# Patient Record
(demographics unavailable — no encounter records)

---

## 2024-10-08 NOTE — PLAN
[FreeTextEntry1] : The current plan is to address her lower back pain with an X-ray and potential referral to an orthopedist or physical therapy depending on the result. I also plan on conducting a bone density scan due to her history of falling. I have recommended treatment for the discomfort on her leg. For her grief, the patient is encouraged to continue attending her groups and consider one-on-one counseling. I have also ordered lab tests to check her overall wellness, including a mammogram.

## 2024-10-08 NOTE — HEALTH RISK ASSESSMENT
[Fair] :  ~his/her~ mood as fair [Monthly or less (1 pt)] : Monthly or less (1 point) [1 or 2 (0 pts)] : 1 or 2 (0 points) [Never (0 pts)] : Never (0 points) [Yes] : In the past 12 months have you used drugs other than those required for medical reasons? Yes [No falls in past year] : Patient reported no falls in the past year [1] : 2) Feeling down, depressed, or hopeless for several days (1) [PHQ-2 Negative - No further assessment needed] : PHQ-2 Negative - No further assessment needed [Former] : Former [20 or more] : 20 or more [> 15 Years] : > 15 Years [FreeTextEntry1] : Grieving loss of brother A (anniversary Thanksgiving) [Audit-CScore] : 1 [de-identified] : THC Gummies [de-identified] : Not that physically active [de-identified] : Could be better [[id number]] : 2 [ColonoscopyDate] : 02/17

## 2024-10-08 NOTE — HISTORY OF PRESENT ILLNESS
[FreeTextEntry1] : Lower back pain and general wellness check. [de-identified] : Ms. Alonzo is a 69-year-old female who presents for her annual wellness visit. She has been experiencing worsening lower back pain and discomfort in her left leg. She reports being part of two separate grief groups following the loss of her brother, and she's seen improvement in her emotional state since starting these groups.  The patient has a chronic issue with lower back pain which she links to several falls she's had in the past. She has difficulty standing straight due to the pain and reports having to lean on shopping carts when at the store. She also reports having pain in her left leg but doesn't believe it's related to her back issues. She mentions a past injury to her left ankle which involved screws being placed in the joint, but these do not bother her much now. Her back pain has been worsening for the last year, coinciding with the passing of her brother, and diminished physical activity. Patient additionally c/o of cough of more than 1 week, productive of green sputum. She denies any sick contacts, fever or chills.  Fungal dermatitis has not resolved. She tried Rx powder and home remedy with tea tree oil.

## 2024-10-08 NOTE — PHYSICAL EXAM
[Normal Outer Ear/Nose] : the outer ears and nose were normal in appearance [Supple] : supple [Normal] : normal rate, regular rhythm, normal S1 and S2 and no murmur heard [Pedal Pulses Present] : the pedal pulses are present [No Edema] : there was no peripheral edema [No Joint Swelling] : no joint swelling [Grossly Normal Strength/Tone] : grossly normal strength/tone [Coordination Grossly Intact] : coordination grossly intact [No Focal Deficits] : no focal deficits [Normal Gait] : normal gait [Normal Affect] : the affect was normal [Normal Insight/Judgement] : insight and judgment were intact [de-identified] : +varicose veins [de-identified] : ROM flexion and extension limited by pain [de-identified] : Fungal dermatitis abdominal fold

## 2024-10-08 NOTE — ASSESSMENT
[FreeTextEntry1] : Differential Diagnosis: - Chronic musculoskeletal pain - Sciatica - Osteoarthritis - Herniated disk

## 2025-05-01 NOTE — PHYSICAL EXAM
[Supple] : supple [Normal] : soft, non-tender, non-distended, no masses palpated, no HSM and normal bowel sounds [No Joint Swelling] : no joint swelling [Grossly Normal Strength/Tone] : grossly normal strength/tone [No Rash] : no rash [Coordination Grossly Intact] : coordination grossly intact [No Focal Deficits] : no focal deficits [Normal Gait] : normal gait [Normal Affect] : the affect was normal [Normal Insight/Judgement] : insight and judgment were intact

## 2025-05-01 NOTE — HEALTH RISK ASSESSMENT
[0] : 2) Feeling down, depressed, or hopeless: Not at all (0) [PHQ-2 Negative - No further assessment needed] : PHQ-2 Negative - No further assessment needed [Former] : Former [20 or more] : 20 or more [> 15 Years] : > 15 Years [[id number]] : 0

## 2025-05-01 NOTE — HISTORY OF PRESENT ILLNESS
[FreeTextEntry1] : Follow up chronic medical conditions and Recurring diarrhea & Ear pain [de-identified] : Patient reported that the diarrhea started about three weeks ago. Despite taking dietary measures to counteract it, her condition didn't improve. She claimed that her stool has been very loose, with a slight color change to lighter brown and her urine has turned bright yellow recently. The patient claims to have had a colonoscopy in 2017 with Dr. Brown, who suggested coming for a checkup again in around seven years, which is due now. Coincidentally, she reported developing a sensation in her right ear, accompanied by pain when she bites down, starting two weeks ago. The patient suspects it might be related to her TMJ problem and her mouth guard. [FreeTextEntry8] : Ear pain and diarrhea

## 2025-05-01 NOTE — PLAN
[FreeTextEntry1] : Continue supporting treatment for diarrhea. Stool and blood testing to investigate further. Meds renewed as requested.

## 2025-05-01 NOTE — HISTORY OF PRESENT ILLNESS
[FreeTextEntry1] : Follow up chronic medical conditions and Recurring diarrhea & Ear pain [de-identified] : Patient reported that the diarrhea started about three weeks ago. Despite taking dietary measures to counteract it, her condition didn't improve. She claimed that her stool has been very loose, with a slight color change to lighter brown and her urine has turned bright yellow recently. The patient claims to have had a colonoscopy in 2017 with Dr. Brown, who suggested coming for a checkup again in around seven years, which is due now. Coincidentally, she reported developing a sensation in her right ear, accompanied by pain when she bites down, starting two weeks ago. The patient suspects it might be related to her TMJ problem and her mouth guard. [FreeTextEntry8] : Ear pain and diarrhea

## 2025-05-01 NOTE — HEALTH RISK ASSESSMENT
[0] : 2) Feeling down, depressed, or hopeless: Not at all (0) [PHQ-2 Negative - No further assessment needed] : PHQ-2 Negative - No further assessment needed [Former] : Former [20 or more] : 20 or more [> 15 Years] : > 15 Years [[id number]] : 0

## 2025-05-01 NOTE — HEALTH RISK ASSESSMENT
[0] : 2) Feeling down, depressed, or hopeless: Not at all (0) [PHQ-2 Negative - No further assessment needed] : PHQ-2 Negative - No further assessment needed [Former] : Former [20 or more] : 20 or more [> 15 Years] : > 15 Years [[id number]] : 0

## 2025-05-01 NOTE — HISTORY OF PRESENT ILLNESS
[FreeTextEntry1] : Follow up chronic medical conditions and Recurring diarrhea & Ear pain [de-identified] : Patient reported that the diarrhea started about three weeks ago. Despite taking dietary measures to counteract it, her condition didn't improve. She claimed that her stool has been very loose, with a slight color change to lighter brown and her urine has turned bright yellow recently. The patient claims to have had a colonoscopy in 2017 with Dr. Brown, who suggested coming for a checkup again in around seven years, which is due now. Coincidentally, she reported developing a sensation in her right ear, accompanied by pain when she bites down, starting two weeks ago. The patient suspects it might be related to her TMJ problem and her mouth guard. [FreeTextEntry8] : Ear pain and diarrhea